# Patient Record
Sex: MALE | Race: WHITE | NOT HISPANIC OR LATINO
[De-identification: names, ages, dates, MRNs, and addresses within clinical notes are randomized per-mention and may not be internally consistent; named-entity substitution may affect disease eponyms.]

---

## 2021-01-01 ENCOUNTER — TRANSCRIPTION ENCOUNTER (OUTPATIENT)
Age: 56
End: 2021-01-01

## 2021-01-01 ENCOUNTER — APPOINTMENT (OUTPATIENT)
Dept: PULMONOLOGY | Facility: CLINIC | Age: 56
End: 2021-01-01
Payer: COMMERCIAL

## 2021-01-01 ENCOUNTER — NON-APPOINTMENT (OUTPATIENT)
Age: 56
End: 2021-01-01

## 2021-01-01 ENCOUNTER — APPOINTMENT (OUTPATIENT)
Dept: NEPHROLOGY | Facility: CLINIC | Age: 56
End: 2021-01-01
Payer: COMMERCIAL

## 2021-01-01 VITALS
OXYGEN SATURATION: 97 % | SYSTOLIC BLOOD PRESSURE: 86 MMHG | HEART RATE: 68 BPM | DIASTOLIC BLOOD PRESSURE: 56 MMHG | RESPIRATION RATE: 16 BRPM

## 2021-01-01 VITALS
DIASTOLIC BLOOD PRESSURE: 71 MMHG | HEART RATE: 80 BPM | WEIGHT: 190.7 LBS | OXYGEN SATURATION: 97 % | TEMPERATURE: 97.7 F | SYSTOLIC BLOOD PRESSURE: 110 MMHG | HEIGHT: 68 IN | BODY MASS INDEX: 28.9 KG/M2

## 2021-01-01 VITALS — HEIGHT: 68 IN | WEIGHT: 186 LBS | BODY MASS INDEX: 28.19 KG/M2

## 2021-01-01 DIAGNOSIS — F41.9 ANXIETY DISORDER, UNSPECIFIED: ICD-10-CM

## 2021-01-01 DIAGNOSIS — E87.1 HYPO-OSMOLALITY AND HYPONATREMIA: ICD-10-CM

## 2021-01-01 DIAGNOSIS — I48.91 UNSPECIFIED ATRIAL FIBRILLATION: ICD-10-CM

## 2021-01-01 DIAGNOSIS — C15.9 MALIGNANT NEOPLASM OF ESOPHAGUS, UNSPECIFIED: ICD-10-CM

## 2021-01-01 DIAGNOSIS — J98.01 ACUTE BRONCHOSPASM: ICD-10-CM

## 2021-01-01 DIAGNOSIS — T17.900A UNSPECIFIED FOREIGN BODY IN RESPIRATORY TRACT, PART UNSPECIFIED CAUSING ASPHYXIATION, INITIAL ENCOUNTER: ICD-10-CM

## 2021-01-01 DIAGNOSIS — F32.9 ANXIETY DISORDER, UNSPECIFIED: ICD-10-CM

## 2021-01-01 DIAGNOSIS — Z00.00 ENCOUNTER FOR GENERAL ADULT MEDICAL EXAMINATION W/OUT ABNORMAL FINDINGS: ICD-10-CM

## 2021-01-01 LAB
ALBUMIN SERPL ELPH-MCNC: 2.8 G/DL
ANION GAP SERPL CALC-SCNC: 10 MMOL/L
APPEARANCE: CLEAR
BACTERIA: NEGATIVE
BILIRUBIN URINE: NEGATIVE
BLOOD URINE: NEGATIVE
BUN SERPL-MCNC: 9 MG/DL
CALCIUM OXALATE CRYSTALS: ABNORMAL
CALCIUM SERPL-MCNC: 8.8 MG/DL
CHLORIDE ?TM UR-SCNC: 128 MMOL/L
CHLORIDE SERPL-SCNC: 99 MMOL/L
CO2 SERPL-SCNC: 25 MMOL/L
COLOR: YELLOW
CORTIS SERPL-MCNC: 18.1 UG/DL
CREAT SERPL-MCNC: 0.53 MG/DL
CREAT SPEC-SCNC: 121 MG/DL
GLUCOSE QUALITATIVE U: NEGATIVE
GLUCOSE SERPL-MCNC: 97 MG/DL
HYALINE CASTS: 4 /LPF
KETONES URINE: NEGATIVE
LEUKOCYTE ESTERASE URINE: NEGATIVE
MICROSCOPIC-UA: NORMAL
NITRITE URINE: NEGATIVE
OSMOLALITY SERPL: 272 MOSMOL/KG
OSMOLALITY UR: 662 MOSM/KG
PH URINE: 7
PHOSPHATE SERPL-MCNC: 3.3 MG/DL
POTASSIUM SERPL-SCNC: 4.5 MMOL/L
POTASSIUM UR-SCNC: 57.4 MMOL/L
PROTEIN URINE: NORMAL
RED BLOOD CELLS URINE: 4 /HPF
SODIUM ?TM SUB UR QN: 117 MMOL/L
SODIUM SERPL-SCNC: 134 MMOL/L
SPECIFIC GRAVITY URINE: 1.02
SQUAMOUS EPITHELIAL CELLS: 1 /HPF
TSH SERPL-ACNC: 1.67 UIU/ML
URATE SERPL-MCNC: 2.7 MG/DL
URATE UR-MCNC: 81.6 MG/DL
URINE COMMENTS: NORMAL
UROBILINOGEN URINE: ABNORMAL
UUN UR-MCNC: 532 MG/DL
WHITE BLOOD CELLS URINE: 4 /HPF

## 2021-01-01 PROCEDURE — 99072 ADDL SUPL MATRL&STAF TM PHE: CPT

## 2021-01-01 PROCEDURE — 99204 OFFICE O/P NEW MOD 45 MIN: CPT

## 2021-01-01 PROCEDURE — 99213 OFFICE O/P EST LOW 20 MIN: CPT | Mod: 95

## 2021-01-01 PROCEDURE — 99203 OFFICE O/P NEW LOW 30 MIN: CPT

## 2021-01-01 RX ORDER — OXYCODONE HYDROCHLORIDE 5 MG/1
5 CAPSULE ORAL
Refills: 0 | Status: ACTIVE | COMMUNITY
Start: 2021-01-01

## 2021-01-01 RX ORDER — CLONAZEPAM 1 MG/1
1 TABLET ORAL 3 TIMES DAILY
Refills: 0 | Status: ACTIVE | COMMUNITY
Start: 2021-01-01

## 2021-01-01 RX ORDER — NORMAL SALT TABLETS 1 G/G
1 TABLET ORAL
Qty: 90 | Refills: 3 | Status: ACTIVE | COMMUNITY
Start: 2021-01-01 | End: 1900-01-01

## 2021-01-01 RX ORDER — SERTRALINE HYDROCHLORIDE 100 MG/1
100 TABLET, FILM COATED ORAL
Refills: 0 | Status: ACTIVE | COMMUNITY
Start: 2021-01-01

## 2021-01-01 RX ORDER — DILTIAZEM HYDROCHLORIDE 360 MG/1
360 CAPSULE, COATED, EXTENDED RELEASE ORAL
Refills: 0 | Status: ACTIVE | COMMUNITY
Start: 2021-01-01

## 2021-01-01 RX ORDER — SERTRALINE HYDROCHLORIDE 50 MG/1
50 TABLET, FILM COATED ORAL DAILY
Qty: 30 | Refills: 0 | Status: DISCONTINUED | COMMUNITY
Start: 2021-01-01 | End: 2021-01-01

## 2021-01-01 RX ORDER — LEVALBUTEROL 1.25 MG/.5ML
1.25 SOLUTION, CONCENTRATE RESPIRATORY (INHALATION) 3 TIMES DAILY
Qty: 90 | Refills: 3 | Status: ACTIVE | COMMUNITY
Start: 2021-01-01 | End: 1900-01-01

## 2021-01-01 RX ORDER — OMEPRAZOLE 40 MG/1
40 CAPSULE, DELAYED RELEASE ORAL
Qty: 30 | Refills: 1 | Status: ACTIVE | COMMUNITY
Start: 2021-01-01

## 2021-01-01 RX ORDER — TRIFLURIDINE AND TIPIRACIL 20; 8.19 MG/1; MG/1
20-8.19 TABLET, FILM COATED ORAL
Refills: 0 | Status: DISCONTINUED | COMMUNITY
Start: 2021-01-01 | End: 2021-01-01

## 2021-01-01 RX ORDER — AMIODARONE HYDROCHLORIDE 200 MG/1
200 TABLET ORAL
Qty: 90 | Refills: 3 | Status: ACTIVE | COMMUNITY
Start: 2021-01-01

## 2021-06-18 PROBLEM — F41.9 ANXIETY AND DEPRESSION: Status: ACTIVE | Noted: 2021-01-01

## 2021-06-18 PROBLEM — E87.1 HYPOSMOLALITY AND/OR HYPONATREMIA: Status: ACTIVE | Noted: 2021-01-01

## 2021-06-18 PROBLEM — Z00.00 ENCOUNTER FOR PREVENTIVE HEALTH EXAMINATION: Status: ACTIVE | Noted: 2021-01-01

## 2021-06-18 NOTE — PHYSICAL EXAM
[General Appearance - Alert] : alert [General Appearance - In No Acute Distress] : in no acute distress [Outer Ear] : the ears and nose were normal in appearance [Oropharynx] : the oropharynx was normal [Neck Cervical Mass (___cm)] : no neck mass was observed [Neck Appearance] : the appearance of the neck was normal [Jugular Venous Distention Increased] : there was no jugular-venous distention [Thyroid Diffuse Enlargement] : the thyroid was not enlarged [Thyroid Nodule] : there were no palpable thyroid nodules [] : no respiratory distress [Auscultation Breath Sounds / Voice Sounds] : lungs were clear to auscultation bilaterally [Heart Rate And Rhythm] : heart rate was normal and rhythm regular [Heart Sounds] : normal S1 and S2 [Heart Sounds Gallop] : no gallops [Murmurs] : no murmurs [Heart Sounds Pericardial Friction Rub] : no pericardial rub [Full Pulse] : the pedal pulses are present [Edema] : there was no peripheral edema [No Focal Deficits] : no focal deficits [Oriented To Time, Place, And Person] : oriented to person, place, and time

## 2021-06-18 NOTE — HISTORY OF PRESENT ILLNESS
[FreeTextEntry1] : Mr. HERNANDEZ is a 55 year old male with dx of esophageal cancer with meds to liver here for recently noted hyponatremia in 125-131 range. He has had normal Na before. He was dx in 2018 with wt loss and GE juntion mass. He started chemo with FOLFOX,  and then POD leading to treatment with Blair-Taxol and Crenolanib in 2020 and then changed to carbo/irinotecan/ POD leading to pembro for 6 weeks in Jan -April 2021 but POD. Now on Lonsurf as of 5/21. As of March 2021, he had new mets and ongoing liver mets with ascites. He was admitted to [place] with Afib as well. Na found to be in 125 range and then 128 but most recent is 135 range after FWR. He never has had salt tabs. BP runs low. Renal function is normal. Per oncology and pt, the Na corrected nicely with treatment of a recent PNA. Recent CT done in June 2021, showed the PNA and also pulm progression of disease. Urine Na was high and Uosm high as well recently. \par Sosm is 277 in June 14th 2021\par Urine Cl 137, Urine K 30 and Urina Na 208\par  Urine Osm 736\par \par He feels well and is eating well for now. Wife has been strict re FW ristriction. He has been on SSRI for last 1.5 years as well. No chronic NSAID use.

## 2021-06-18 NOTE — ASSESSMENT
[FreeTextEntry1] : Mr. HERNANDEZ is a 55 year old male with esophageal cancer with mets and has failed several chemo including immunotherapy. At this point on Trifluridine/tipiracil doesn't cause any hyponatremia. 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  Pembro can cause SIADH as well and or adrenal issues leading to Na concerns but less likely\par \par For now, recheck urine studies\par Start Na tabs 1g BID if Na today <130\par FWR to continue for now\par Increase solute intake- can be more protein shakes over salt tabs\par SSRI can cause this but he needs it- no change there\par Ok to be conservative for Na 131-137 range. Can do Na tabs, lasix and FWR if worse or ure-na 15gm BID and in tough cases SGLT2i as well may work\par \par Pt aware and labs to be sent to me weekly\par .fu 2 months

## 2021-07-29 PROBLEM — C15.9 ESOPHAGEAL CANCER: Status: ACTIVE | Noted: 2021-01-01

## 2021-07-29 PROBLEM — T17.900A ASPIRATION SYNDROME: Status: ACTIVE | Noted: 2021-01-01

## 2021-07-29 PROBLEM — I48.91 ATRIAL FIBRILLATION, UNSPECIFIED TYPE: Status: ACTIVE | Noted: 2021-01-01

## 2021-07-29 NOTE — PHYSICAL EXAM
[No Acute Distress] : no acute distress [Normal Oropharynx] : normal oropharynx [No Resp Distress] : no resp distress [Clear to Auscultation Bilaterally] : clear to auscultation bilaterally

## 2021-07-30 NOTE — HISTORY OF PRESENT ILLNESS
[TextBox_4] : [facility] referral for local pulmonary care\par Esophageal canceron Lonsurf\par Cough => Augmentin on 7/21/21 - brief hospitalization then Augmentin again\par No real infiltrate on CXR of 7/21/21\par Eats then falls asleep flat on couch while watching TV\par Leg edema=> negative leg duplex on 7/21/21\par Shallow breathing during sleep\par Esophageal mass progression with mass effect on left atrium\par  for [employer] at [facility]\par Former smoker\par Lives in [place]

## 2021-09-09 PROBLEM — J98.01 BRONCHOSPASM: Status: ACTIVE | Noted: 2021-01-01

## 2021-09-09 NOTE — HISTORY OF PRESENT ILLNESS
[Home] : at home, [unfilled] , at the time of the visit. [Medical Office: ([facility])___] : at the medical office located in  [Verbal consent obtained from patient] : the patient, [unfilled] [TextBox_4] : [facility] referral for local pulmonary care\par Esophageal canceron Lonsurf\par Cough => Augmentin on 7/21/21 - brief hospitalization then Augmentin again\par No real infiltrate on CXR of 7/21/21\par Eats then falls asleep flat on couch while watching TV\par Leg edema=> negative leg duplex on 7/21/21\par Shallow breathing during sleep\par Esophageal mass progression with mass effect on left atrium\par  for [employer] at [facility]\par Former smoker\par Lives in [place]\par \par 9/9/21\par Mucous issues and cough\par Told not to take Dextromethorphan or codeine by pharmacist \par 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